# Patient Record
Sex: FEMALE | Race: WHITE | Employment: OTHER | URBAN - METROPOLITAN AREA
[De-identification: names, ages, dates, MRNs, and addresses within clinical notes are randomized per-mention and may not be internally consistent; named-entity substitution may affect disease eponyms.]

---

## 2017-02-07 ENCOUNTER — TELEPHONE (OUTPATIENT)
Dept: CARDIOLOGY CLINIC | Age: 72
End: 2017-02-07

## 2017-10-02 ENCOUNTER — OFFICE VISIT (OUTPATIENT)
Dept: CARDIOLOGY CLINIC | Age: 72
End: 2017-10-02

## 2017-10-02 VITALS
SYSTOLIC BLOOD PRESSURE: 128 MMHG | OXYGEN SATURATION: 95 % | HEIGHT: 63 IN | DIASTOLIC BLOOD PRESSURE: 80 MMHG | BODY MASS INDEX: 32.32 KG/M2 | WEIGHT: 182.4 LBS | HEART RATE: 62 BPM

## 2017-10-02 DIAGNOSIS — E66.9 OBESITY, UNSPECIFIED OBESITY SEVERITY, UNSPECIFIED OBESITY TYPE: ICD-10-CM

## 2017-10-02 DIAGNOSIS — I48.3 TYPICAL ATRIAL FLUTTER (HCC): ICD-10-CM

## 2017-10-02 DIAGNOSIS — Z86.79 S/P ABLATION OF ATRIAL FLUTTER: ICD-10-CM

## 2017-10-02 DIAGNOSIS — I10 ESSENTIAL HYPERTENSION: ICD-10-CM

## 2017-10-02 DIAGNOSIS — Z98.890 S/P ABLATION OF ATRIAL FLUTTER: ICD-10-CM

## 2017-10-02 DIAGNOSIS — R00.0 SINUS TACHYCARDIA: Primary | ICD-10-CM

## 2017-10-02 DIAGNOSIS — Z72.0 TOBACCO ABUSE: ICD-10-CM

## 2017-10-02 PROCEDURE — 4004F PT TOBACCO SCREEN RCVD TLK: CPT | Performed by: INTERNAL MEDICINE

## 2017-10-02 PROCEDURE — G8484 FLU IMMUNIZE NO ADMIN: HCPCS | Performed by: INTERNAL MEDICINE

## 2017-10-02 PROCEDURE — 3017F COLORECTAL CA SCREEN DOC REV: CPT | Performed by: INTERNAL MEDICINE

## 2017-10-02 PROCEDURE — 99214 OFFICE O/P EST MOD 30 MIN: CPT | Performed by: INTERNAL MEDICINE

## 2017-10-02 PROCEDURE — 3014F SCREEN MAMMO DOC REV: CPT | Performed by: INTERNAL MEDICINE

## 2017-10-02 PROCEDURE — 1090F PRES/ABSN URINE INCON ASSESS: CPT | Performed by: INTERNAL MEDICINE

## 2017-10-02 PROCEDURE — 1123F ACP DISCUSS/DSCN MKR DOCD: CPT | Performed by: INTERNAL MEDICINE

## 2017-10-02 PROCEDURE — G8400 PT W/DXA NO RESULTS DOC: HCPCS | Performed by: INTERNAL MEDICINE

## 2017-10-02 PROCEDURE — 4040F PNEUMOC VAC/ADMIN/RCVD: CPT | Performed by: INTERNAL MEDICINE

## 2017-10-02 PROCEDURE — 93000 ELECTROCARDIOGRAM COMPLETE: CPT | Performed by: INTERNAL MEDICINE

## 2017-10-02 PROCEDURE — G8417 CALC BMI ABV UP PARAM F/U: HCPCS | Performed by: INTERNAL MEDICINE

## 2017-10-02 PROCEDURE — G8427 DOCREV CUR MEDS BY ELIG CLIN: HCPCS | Performed by: INTERNAL MEDICINE

## 2017-10-02 NOTE — LETTER
[address] [address] [address] [address]  Phone: [phone]  Fax: [phone]    oRber Florian MD        October 2, 2017     Mason Pruitt [address] [address] [address] [place] [address]    Patient: Nakita Ortiz  MR Number: [id number]  YOB: 1945  Date of Visit: 10/2/2017    Dear Dr. Mason Pruitt:    Below are the relevant portions of my assessment and plan of care. [facility], [place]   Electrophysiology   Date: 10/2/2017    CC: Follow up for atrial flutter   HPI: Nakita Ortiz is a 67 y.o. initially presented to [facility] 5/11/16 with dizziness/lightheadedness. EKG showed 2:1 atrial flutter with rate in the 130's. She was given IV Cardizem and rate slowed to <100bpm.  She had mild trop rise (0.24). A myoview stress test was performed that revealed normal perfusion. She also had an echo with EF 50-55%. Dr Candice Donohue saw her in consult. She was started on Digoxin, Cardizem, and Xarelto. She smokes 1.5-2 PPD and has for many years. Describes family history of heart disease in her father and siblings. She underwent successful ablation of cavotricuspid isthmus dependant atrial flutter on 5/18/16. She was seen in follow up in June 2016 and was switched from Xarelto to ASA. Interval History: Today she states she is doing well from the cardiac standpoint. She denies heart skipping or racing and has not had a known recurrence of Atrial fib. She denies exertional chest pain, shortness of breath, dizziness, or edema. She continues to smoke cigarettes and is not interested in quitting. She does not want to pursue sleep evaluation.           Past Medical History:   Diagnosis Date    Atrial flutter [facility])         Past Surgical History:   Procedure Laterality Date    ABLATION OF DYSRHYTHMIC FOCUS  5/18/16    atrial flutter ablation       Allergies   Allergen Reactions    Prednisone Other (See Comments)     Caused heart palpitations Medication:   Current Outpatient Prescriptions   Medication Sig Dispense Refill    traMADol (ULTRAM) 50 MG tablet Take 50 mg by mouth every 6 hours as needed       hydrochlorothiazide (HYDRODIURIL) 25 MG tablet 25 mg daily       acetaminophen (TYLENOL) 500 MG tablet Take 500 mg by mouth every 6 hours as needed for Pain      diphenhydrAMINE (ALLERGY RELIEF) 25 MG capsule Take 25 mg by mouth every 6 hours as needed for Itching      aspirin EC 81 MG EC tablet Take 1 tablet by mouth daily (Patient taking differently: Take 162 mg by mouth daily ) 30 tablet 3    carvedilol (COREG) 3.125 MG tablet Take 3.125 mg by mouth 2 times daily (with meals)      docusate sodium (COLACE) 100 MG capsule Take 100 mg by mouth 2 times daily       No current facility-administered medications for this visit. Social History:   reports that she has been smoking. She has been smoking about 2.00 packs per day. She does not have any smokeless tobacco history on file. She reports that she does not drink alcohol. smokes 2PPD    Family History:  family history includes Heart Disease in her brother; High Blood Pressure in her brother; High Cholesterol in her brother. Review of System:    · General: negative for fever, chills, fatigue  · Ophthalmic ROS: negative for - eye pain or loss of vision  · ENT ROS: negative for - headaches, sore throat   · Respiratory: negative for - cough, sputum  · Cardiovascular: Reviewed in HPI,  · Gastrointestinal: negative for - abdominal pain, diarrhea, N/V  · Hematology: negative for - bleeding, blood clots, bruising or jaundice  · Genito-Urinary:  negative for - Dysuria or incontinence  · Musculoskeletal: negative for - Joint swelling, muscle pain  · Neurological: negative for - confusion, headaches,  · Psychiatric: No anxiety, no depression.   · Dermatological: negative for - rash    Physical Examination:  Vitals:    10/02/17 1436   BP: 128/80   Pulse: 62   SpO2: 95% Further evaluation will be based upon the patient's clinical course and testing results. All questions and concerns were addressed to the patient/family. Alternatives to my treatment were discussed. Rober Florian MD, MPH  [facility]   Office: [phone]            If you have questions, please do not hesitate to call me. I look forward to following Monika Fairchild along with you.     Sincerely,        Rober Florian MD

## 2017-10-02 NOTE — PROGRESS NOTES
 aspirin EC 81 MG EC tablet Take 1 tablet by mouth daily (Patient taking differently: Take 162 mg by mouth daily ) 30 tablet 3    carvedilol (COREG) 3.125 MG tablet Take 3.125 mg by mouth 2 times daily (with meals)      docusate sodium (COLACE) 100 MG capsule Take 100 mg by mouth 2 times daily       No current facility-administered medications for this visit. Social History:   reports that she has been smoking. She has been smoking about 2.00 packs per day. She does not have any smokeless tobacco history on file. She reports that she does not drink alcohol. smokes 2PPD    Family History:  family history includes Heart Disease in her brother; High Blood Pressure in her brother; High Cholesterol in her brother. Review of System:    · General: negative for fever, chills, fatigue  · Ophthalmic ROS: negative for - eye pain or loss of vision  · ENT ROS: negative for - headaches, sore throat   · Respiratory: negative for - cough, sputum  · Cardiovascular: Reviewed in HPI,  · Gastrointestinal: negative for - abdominal pain, diarrhea, N/V  · Hematology: negative for - bleeding, blood clots, bruising or jaundice  · Genito-Urinary:  negative for - Dysuria or incontinence  · Musculoskeletal: negative for - Joint swelling, muscle pain  · Neurological: negative for - confusion, headaches,  · Psychiatric: No anxiety, no depression. · Dermatological: negative for - rash    Physical Examination:  Vitals:    10/02/17 1436   BP: 128/80   Pulse: 62   SpO2: 95%       · Constitutional: Oriented. No distress. · Head: Normocephalic and atraumatic. · Mouth/Throat: Oropharynx is clear and moist.   · Eyes: Conjunctivae normal. EOM are normal.   · Neck: Normal range of motion. Neck supple. No rigidity. No JVD present. · Cardiovascular: Regular rate, regular rhythm, L3&O1 systolic murmur   · Pulmonary/Chest: Bilateral respiratory sounds. No wheezes. No rhonchi. · Abdominal: Soft. Bowel sounds present.  No tenderness. · Musculoskeletal: No tenderness. No edema    · Lymphadenopathy: Has no cervical adenopathy. · Neurological: Alert and oriented. No Gross deficit   · Skin: Skin is warm and dry. No rash noted. · Psychiatric: Has a normal behavior     Labs:  Reviewed. ECG:  Reviewed by me today shows sinus rhythm, 59 bpm   Echo: 5/12/2016: EF 50-55%, essentially normal  GXT: 5/12/2016: normal myocardial perfusion  Cath: 7/17/2008: normal coronaries     Assessment:   Patient Active Problem List    Diagnosis Date Noted    Obesity 09/26/2016     Priority: Low    S/P ablation of atrial flutter 05/18/2016     Priority: Low    Chronic bronchitis (HCC)      Priority: Low    Essential hypertension 05/17/2016     Priority: Low    Typical atrial flutter ([address] 05/17/2016     Priority: Low    Sinus tachycardia 05/17/2016     Priority: Low    Tobacco abuse 05/17/2016     Priority: Low     Atrial flutter with RVR:     - s/p ablation of cavotricuspid isthmus dependant atrial flutter 5/18/16              - No recurrences. - Off anticoagulation.    - On ASA    - Clinical follow up     - TIMA?       - Not interested in sleep studies. Obesity: Body mass index is 32.31 kg/(m^2). - Discussed weight loss with diet and exercise    - Weight up 7 lbs over the past year. HTN -Blood pressure: Controlled. - Smoker: Again I strongly advised to stop smoking. Follow up with EP as needed. Thank you for allowing me to participate in the care of [facility]. Further evaluation will be based upon the patient's clinical course and testing results. All questions and concerns were addressed to the patient/family. Alternatives to my treatment were discussed.      Antony Gilliland MD, MPH  [place] [address]   Office: [phone]

## 2017-10-02 NOTE — COMMUNICATION BODY
[address]   Electrophysiology   Date: 10/2/2017    CC: Follow up for atrial flutter   HPI: Roni Bolivar is a 67 y.o. initially presented to [facility] 5/11/16 with dizziness/lightheadedness. EKG showed 2:1 atrial flutter with rate in the 130's. She was given IV Cardizem and rate slowed to <100bpm.  She had mild trop rise (0.24). A myoview stress test was performed that revealed normal perfusion. She also had an echo with EF 50-55%. Dr Lesvia Valenzuela saw her in consult. She was started on Digoxin, Cardizem, and Xarelto. She smokes 1.5-2 PPD and has for many years. Describes family history of heart disease in her father and siblings. She underwent successful ablation of cavotricuspid isthmus dependant atrial flutter on 5/18/16. She was seen in follow up in June 2016 and was switched from Xarelto to ASA. Interval History: Today she states she is doing well from the cardiac standpoint. She denies heart skipping or racing and has not had a known recurrence of Atrial fib. She denies exertional chest pain, shortness of breath, dizziness, or edema. She continues to smoke cigarettes and is not interested in quitting. She does not want to pursue sleep evaluation.           Past Medical History:   Diagnosis Date    Atrial flutter [facility])         Past Surgical History:   Procedure Laterality Date    ABLATION OF DYSRHYTHMIC FOCUS  5/18/16    atrial flutter ablation       Allergies   Allergen Reactions    Prednisone Other (See Comments)     Caused heart palpitations        Medication:   Current Outpatient Prescriptions   Medication Sig Dispense Refill    traMADol (ULTRAM) 50 MG tablet Take 50 mg by mouth every 6 hours as needed       hydrochlorothiazide (HYDRODIURIL) 25 MG tablet 25 mg daily       acetaminophen (TYLENOL) 500 MG tablet Take 500 mg by mouth every 6 hours as needed for Pain      diphenhydrAMINE (ALLERGY RELIEF) 25 MG capsule Take 25 mg by mouth every 6 hours as needed for Itching  aspirin EC 81 MG EC tablet Take 1 tablet by mouth daily (Patient taking differently: Take 162 mg by mouth daily ) 30 tablet 3    carvedilol (COREG) 3.125 MG tablet Take 3.125 mg by mouth 2 times daily (with meals)      docusate sodium (COLACE) 100 MG capsule Take 100 mg by mouth 2 times daily       No current facility-administered medications for this visit. Social History:   reports that she has been smoking. She has been smoking about 2.00 packs per day. She does not have any smokeless tobacco history on file. She reports that she does not drink alcohol. smokes 2PPD    Family History:  family history includes Heart Disease in her brother; High Blood Pressure in her brother; High Cholesterol in her brother. Review of System:    · General: negative for fever, chills, fatigue  · Ophthalmic ROS: negative for - eye pain or loss of vision  · ENT ROS: negative for - headaches, sore throat   · Respiratory: negative for - cough, sputum  · Cardiovascular: Reviewed in HPI,  · Gastrointestinal: negative for - abdominal pain, diarrhea, N/V  · Hematology: negative for - bleeding, blood clots, bruising or jaundice  · Genito-Urinary:  negative for - Dysuria or incontinence  · Musculoskeletal: negative for - Joint swelling, muscle pain  · Neurological: negative for - confusion, headaches,  · Psychiatric: No anxiety, no depression. · Dermatological: negative for - rash    Physical Examination:  Vitals:    10/02/17 1436   BP: 128/80   Pulse: 62   SpO2: 95%       · Constitutional: Oriented. No distress. · Head: Normocephalic and atraumatic. · Mouth/Throat: Oropharynx is clear and moist.   · Eyes: Conjunctivae normal. EOM are normal.   · Neck: Normal range of motion. Neck supple. No rigidity. No JVD present. · Cardiovascular: Regular rate, regular rhythm, R5&N3 systolic murmur   · Pulmonary/Chest: Bilateral respiratory sounds. No wheezes. No rhonchi. · Abdominal: Soft. Bowel sounds present.  No tenderness. · Musculoskeletal: No tenderness. No edema    · Lymphadenopathy: Has no cervical adenopathy. · Neurological: Alert and oriented. No Gross deficit   · Skin: Skin is warm and dry. No rash noted. · Psychiatric: Has a normal behavior     Labs:  Reviewed. ECG:  Reviewed by me today shows sinus rhythm, 59 bpm   Echo: 5/12/2016: EF 50-55%, essentially normal  GXT: 5/12/2016: normal myocardial perfusion  Cath: 7/17/2008: normal coronaries     Assessment:   Patient Active Problem List    Diagnosis Date Noted    Obesity 09/26/2016     Priority: Low    S/P ablation of atrial flutter 05/18/2016     Priority: Low    Chronic bronchitis (HCC)      Priority: Low    Essential hypertension 05/17/2016     Priority: Low    Typical atrial flutter ([address] 05/17/2016     Priority: Low    Sinus tachycardia 05/17/2016     Priority: Low    Tobacco abuse 05/17/2016     Priority: Low     Atrial flutter with RVR:     - s/p ablation of cavotricuspid isthmus dependant atrial flutter 5/18/16              - No recurrences. - Off anticoagulation.    - On ASA    - Clinical follow up     - TIMA?       - Not interested in sleep studies. Obesity: Body mass index is 32.31 kg/(m^2). - Discussed weight loss with diet and exercise    - Weight up 7 lbs over the past year. HTN -Blood pressure: Controlled. - Smoker: Again I strongly advised to stop smoking. Follow up with EP as needed. Thank you for allowing me to participate in the care of [facility]. Further evaluation will be based upon the patient's clinical course and testing results. All questions and concerns were addressed to the patient/family. Alternatives to my treatment were discussed.      Yonis Valdez MD, MPH  [facility]   Office: [phone]

## 2017-10-02 NOTE — MR AVS SNAPSHOT
After Visit Summary             Santosh Mccollum   10/2/2017 2:30 PM   Office Visit    Description:  Female : 1945   Provider:  Concepcion Louis MD   Department:  [address] and Future Appointments         Below is a list of your follow-up and future appointments. This may not be a complete list as you may have made appointments directly with providers that we are not aware of or your providers may have made some for you. Please call your providers to confirm appointments. It is important to keep your appointments. Please bring your current insurance card, photo ID, co-pay, and all medication bottles to your appointment. If self-pay, payment is expected at the time of service. Information from Your Visit        Department     Name Address Phone Fax    [address] Cardiology - [facility] [address] [facility]  [address] [facility] [address]  [address] [phone]      You Were Seen for:         Comments    Sinus tachycardia   [[id number]]         Vital Signs     Blood Pressure Pulse Height Weight Oxygen Saturation Body Mass Index    128/80 (Site: Left Arm, Position: Sitting, Cuff Size: Large Adult) 62 5' 3\" (1.6 m) 182 lb 6.4 oz (82.7 kg) 95% 32.31 kg/m2    Smoking Status                   Current Every Day Smoker           Additional Information about your Body Mass Index (BMI)           Your BMI as listed above is considered obese (30 or more). BMI is an estimate of body fat, calculated from your height and weight. The higher your BMI, the greater your risk of heart disease, high blood pressure, type 2 diabetes, stroke, gallstones, arthritis, sleep apnea, and certain cancers. BMI is not perfect. It may overestimate body fat in athletes and people who are more muscular.   Even a small weight loss (between 5 and 10 percent of your current weight) by decreasing your calorie intake and becoming more physically active will help lower your risk of developing or worsening diseases associated with obesity. Learn more at: [email or web address]             Medications and Orders      Your Current Medications Are              traMADol (ULTRAM) 50 MG tablet Take 50 mg by mouth every 6 hours as needed     hydrochlorothiazide (HYDRODIURIL) 25 MG tablet 25 mg daily     acetaminophen (TYLENOL) 500 MG tablet Take 500 mg by mouth every 6 hours as needed for Pain    diphenhydrAMINE (ALLERGY RELIEF) 25 MG capsule Take 25 mg by mouth every 6 hours as needed for Itching    aspirin EC 81 MG EC tablet Take 1 tablet by mouth daily    carvedilol (COREG) 3.125 MG tablet Take 3.125 mg by mouth 2 times daily (with meals)    docusate sodium (COLACE) 100 MG capsule Take 100 mg by mouth 2 times daily      Allergies              Prednisone Other (See Comments)    Caused heart palpitations       We Ordered/Performed the following           EKG 12 Lead          Result Summary for EKG 12 Lead      Result Information     Status          Final result (Resulted: 10/2/2017)           10/2/2017  2:46 PM      Scans on Order [id number]            ECG on 10/2/2017  2:45 PM : ECG Report                     Additional Information        Basic Information     Date Of Birth Sex Race Ethnicity Preferred Language    1945 Female White Non-/Non  English      Problem List as of 10/2/2017  Date Reviewed: 10/2/2017                Obesity    S/P ablation of atrial flutter    Essential hypertension    Typical atrial flutter (HCC)    Sinus tachycardia    Tobacco abuse      Preventive Care        Date Due    Hepatitis C screening is recommended for all adults regardless of risk factors born between [facility] at least once (lifetime) who have never been tested.  1945    Tetanus Combination Vaccine (1 - Tdap) 8/19/1964    Cholesterol Screening 8/19/1985    Mammograms are recommended every 2 years for low/average risk patients